# Patient Record
Sex: FEMALE | Race: WHITE | Employment: UNEMPLOYED | URBAN - METROPOLITAN AREA
[De-identification: names, ages, dates, MRNs, and addresses within clinical notes are randomized per-mention and may not be internally consistent; named-entity substitution may affect disease eponyms.]

---

## 2017-02-17 ENCOUNTER — HOSPITAL ENCOUNTER (EMERGENCY)
Age: 72
Discharge: HOME | End: 2017-02-17
Attending: EMERGENCY MEDICINE
Payer: MEDICARE

## 2017-02-17 VITALS
OXYGEN SATURATION: 97 % | RESPIRATION RATE: 14 BRPM | HEART RATE: 76 BPM | HEIGHT: 67 IN | SYSTOLIC BLOOD PRESSURE: 158 MMHG | TEMPERATURE: 98 F | WEIGHT: 187 LBS | BODY MASS INDEX: 29.35 KG/M2 | DIASTOLIC BLOOD PRESSURE: 82 MMHG

## 2017-02-17 DIAGNOSIS — S01.01XA SCALP LACERATION, INITIAL ENCOUNTER: Primary | ICD-10-CM

## 2017-02-17 PROCEDURE — 90471 IMMUNIZATION ADMIN: CPT | Performed by: EMERGENCY MEDICINE

## 2017-02-17 PROCEDURE — 12001 RPR S/N/AX/GEN/TRNK 2.5CM/<: CPT

## 2017-02-17 PROCEDURE — 90715 TDAP VACCINE 7 YRS/> IM: CPT | Performed by: EMERGENCY MEDICINE

## 2017-02-17 PROCEDURE — 99282 EMERGENCY DEPT VISIT SF MDM: CPT

## 2017-02-17 PROCEDURE — 6360000002 HC RX W HCPCS: Performed by: EMERGENCY MEDICINE

## 2017-02-17 RX ORDER — ISOSORBIDE DINITRATE 10 MG/1
10 TABLET ORAL DAILY
COMMUNITY

## 2017-02-17 RX ORDER — METOPROLOL SUCCINATE 25 MG/1
25 TABLET, EXTENDED RELEASE ORAL DAILY
COMMUNITY

## 2017-02-17 RX ORDER — LEVOTHYROXINE SODIUM 112 UG/1
112 TABLET ORAL DAILY
COMMUNITY

## 2017-02-17 RX ORDER — ALLOPURINOL 100 MG/1
100 TABLET ORAL DAILY
COMMUNITY

## 2017-02-17 RX ORDER — LISINOPRIL AND HYDROCHLOROTHIAZIDE 20; 12.5 MG/1; MG/1
1 TABLET ORAL DAILY
COMMUNITY

## 2017-02-17 RX ADMIN — TETANUS TOXOID, REDUCED DIPHTHERIA TOXOID AND ACELLULAR PERTUSSIS VACCINE, ADSORBED 0.5 ML: 5; 2.5; 8; 8; 2.5 SUSPENSION INTRAMUSCULAR at 21:19

## 2017-02-17 ASSESSMENT — PAIN DESCRIPTION - ORIENTATION: ORIENTATION: POSTERIOR

## 2017-02-17 ASSESSMENT — PAIN SCALES - GENERAL: PAINLEVEL_OUTOF10: 2

## 2017-02-17 ASSESSMENT — PAIN DESCRIPTION - LOCATION: LOCATION: HEAD

## 2017-02-17 ASSESSMENT — PAIN DESCRIPTION - DESCRIPTORS: DESCRIPTORS: BURNING

## 2017-02-18 ASSESSMENT — ENCOUNTER SYMPTOMS
ABDOMINAL PAIN: 0
BACK PAIN: 0
CONSTIPATION: 0
EYE REDNESS: 0
EYE DISCHARGE: 0
DIARRHEA: 0
SHORTNESS OF BREATH: 0
COLOR CHANGE: 0
NAUSEA: 0
EYE PAIN: 0
WHEEZING: 0
RHINORRHEA: 0
COUGH: 0
SORE THROAT: 0
CHEST TIGHTNESS: 0

## 2018-07-25 ENCOUNTER — APPOINTMENT (OUTPATIENT)
Dept: CT IMAGING | Age: 73
End: 2018-07-25
Payer: MEDICARE

## 2018-07-25 ENCOUNTER — HOSPITAL ENCOUNTER (EMERGENCY)
Age: 73
Discharge: HOME | End: 2018-07-25
Attending: EMERGENCY MEDICINE
Payer: MEDICARE

## 2018-07-25 VITALS
HEART RATE: 79 BPM | OXYGEN SATURATION: 98 % | BODY MASS INDEX: 28.12 KG/M2 | HEIGHT: 66 IN | TEMPERATURE: 98 F | DIASTOLIC BLOOD PRESSURE: 73 MMHG | WEIGHT: 175 LBS | SYSTOLIC BLOOD PRESSURE: 122 MMHG | RESPIRATION RATE: 14 BRPM

## 2018-07-25 DIAGNOSIS — S80.02XA CONTUSION OF LEFT KNEE, INITIAL ENCOUNTER: ICD-10-CM

## 2018-07-25 DIAGNOSIS — S09.90XA INJURY OF HEAD, INITIAL ENCOUNTER: Primary | ICD-10-CM

## 2018-07-25 DIAGNOSIS — S16.1XXA STRAIN OF NECK MUSCLE, INITIAL ENCOUNTER: ICD-10-CM

## 2018-07-25 PROCEDURE — 99283 EMERGENCY DEPT VISIT LOW MDM: CPT

## 2018-07-25 PROCEDURE — 72125 CT NECK SPINE W/O DYE: CPT

## 2018-07-25 PROCEDURE — 70450 CT HEAD/BRAIN W/O DYE: CPT

## 2018-07-25 ASSESSMENT — PAIN SCALES - GENERAL: PAINLEVEL_OUTOF10: 1

## 2018-07-25 ASSESSMENT — PAIN DESCRIPTION - LOCATION: LOCATION: HEAD;KNEE

## 2018-07-25 ASSESSMENT — PAIN DESCRIPTION - ORIENTATION: ORIENTATION: LEFT

## 2018-07-25 NOTE — ED PROVIDER NOTES
[address] [facility]  eMERGENCY dEPARTMENT eNCOUnter      Pt Name: Alida Wilson  MRN: [id number]  [place] 1945  Date of evaluation: 7/25/2018      CHIEF COMPLAINT       Chief Complaint   Patient presents with    Fall     pt was cleaning house and just finished mopping, pt brought up laundry from basement stairs, and slipped on wet tile floor when she got upstairs. pt slipped backwards, hyperflexing left knee and striking head on tile floor. pt c/o head pain, no hematoma noted or lacerations. swelling and ecchymosis noted to left knee. pms intact.  Head Injury    Knee Pain         HISTORY OF PRESENT ILLNESS      The patient presents to the emergency department for a fall. She slipped on a wet floor and struck the back of her head on the tile floor. She says her head bounced on the floor. She also bent her left knee under her but her knee is not really bothering her. The patient says she awoke with some neck discomfort this morning so she's having some pain in the left side of the neck. She denies any acute neurologic deficit however. She had no LOC. She denies weakness or numbness. She denies headache. She declines medicine for pain. The patient takes baby aspirin every day. Her family is concerned to have her evaluated. She does not have any problems with walking and doesn't feel her knee needs to be addressed. REVIEW OF SYSTEMS       All systems reviewed and negative unless noted in HPI. The patient denies fever or constitutional symptoms. Denies vision change. Denies any sore throat or rhinorrhea. Struck head as noted in HPI. Denies any neck pain or stiffness. Denies chest pain or shortness of breath. No nausea,  vomiting or diarrhea. Denies any dysuria. Denies urinary frequency or hematuria. Mild left knee pain. Denies any weakness, numbness or focal neurologic deficit. Denies any skin rash or edema. No recent psychiatric issues.    No easy nontender with no pain to palpation. Musculoskeletal exam:  Mild swelling and superficial abrasion noted over the left knee at the proximal tibia. No joint effusion. Able to flex and extend normally without pain. Normal distal pulses in all extremities. Skin: no rash or edema. Neurological exam reveals cranial nerves 2 through 12 grossly intact. Patient has equal  and normal deep tendon reflexes. Psychiatric: no hallucinations or suicidal ideation. Lymphatics.:  No lymphadenopathy. DIFFERENTIAL DIAGNOSIS/ MDM:     Contusion, fracture, sprain, ICH    DIAGNOSTIC RESULTS     RADIOLOGY:   I directly visualized the following  images and reviewed the radiologist interpretations:  CT Head WO Contrast   Final Result   1. No CT evidence for acute intracranial process. 2.  Multilevel degenerative disc disease and hypertrophic degenerative   changes of the cervical spine, but no CT evidence for acute osseous   abnormality. CT Cervical Spine WO Contrast   Final Result   1. No CT evidence for acute intracranial process. 2.  Multilevel degenerative disc disease and hypertrophic degenerative   changes of the cervical spine, but no CT evidence for acute osseous   abnormality. CT Cervical Spine WO Contrast (Final result)   Result time 07/25/18 14:31:23   Final result by Saray Piedra MD (07/25/18 14:31:23)                Impression:    1. No CT evidence for acute intracranial process. 2.  Multilevel degenerative disc disease and hypertrophic degenerative  changes of the cervical spine, but no CT evidence for acute osseous  abnormality.             Narrative:    EXAMINATION:  CT OF THE HEAD WITHOUT CONTRAST; CT OF THE CERVICAL SPINE WITHOUT CONTRAST  7/25/2018 1:18 pm    TECHNIQUE:  CT of the head was performed without the administration of intravenous  contrast. Dose modulation, iterative reconstruction, and/or weight based  adjustment of the mA/kV was utilized to reduce the foraminal  stenosis at C4-C5 through C6-C7. SOFT TISSUES:  No prevertebral soft tissue thickening.  The incidentally  imaged paravertebral soft tissues have a normal noncontrast CT appearance. No abnormal lymphadenopathy appreciated.  Thyroid has a normal noncontrast CT  appearance.  Visualized lung parenchyma is well aerated.                    CT Head WO Contrast (Final result)   Result time 07/25/18 14:31:23   Final result by Anurag Wilson MD (07/25/18 14:31:23)                Impression:    1. No CT evidence for acute intracranial process. 2.  Multilevel degenerative disc disease and hypertrophic degenerative  changes of the cervical spine, but no CT evidence for acute osseous  abnormality. Narrative:    EXAMINATION:  CT OF THE HEAD WITHOUT CONTRAST; CT OF THE CERVICAL SPINE WITHOUT CONTRAST  7/25/2018 1:18 pm    TECHNIQUE:  CT of the head was performed without the administration of intravenous  contrast. Dose modulation, iterative reconstruction, and/or weight based  adjustment of the mA/kV was utilized to reduce the radiation dose to as low  as reasonably achievable.; CT of the cervical spine was performed without the  administration of intravenous contrast. Multiplanar reformatted images are  provided for review. Dose modulation, iterative reconstruction, and/or weight  based adjustment of the mA/kV was utilized to reduce the radiation dose to as  low as reasonably achievable. COMPARISON:  None. HISTORY:  ORDERING SYSTEM PROVIDED HISTORY: fall  TECHNOLOGIST PROVIDED HISTORY:  Ordering Physician Provided Reason for Exam: posterior head trauma/pain  Acuity: Acute  Type of Exam: Initial  Mechanism of Injury: fall hitting posterior head on tile    FINDINGS:  CT HEAD:    BRAIN/VENTRICLES: There is no acute intracranial hemorrhage, mass effect or  midline shift.  No abnormal extra-axial fluid collection.  The gray-white  differentiation is maintained without evidence of an acute infarct.

## 2018-09-07 ENCOUNTER — HOSPITAL ENCOUNTER (EMERGENCY)
Age: 73
Discharge: HOME | End: 2018-09-07
Attending: EMERGENCY MEDICINE
Payer: MEDICARE

## 2018-09-07 VITALS
TEMPERATURE: 101 F | RESPIRATION RATE: 18 BRPM | HEIGHT: 66 IN | WEIGHT: 175 LBS | BODY MASS INDEX: 28.12 KG/M2 | HEART RATE: 101 BPM | SYSTOLIC BLOOD PRESSURE: 128 MMHG | DIASTOLIC BLOOD PRESSURE: 63 MMHG | OXYGEN SATURATION: 94 %

## 2018-09-07 DIAGNOSIS — E86.0 DEHYDRATION: ICD-10-CM

## 2018-09-07 DIAGNOSIS — R11.10 VOMITING IN ADULT: Primary | ICD-10-CM

## 2018-09-07 LAB
-: ABNORMAL
ABSOLUTE EOS #: 0 K/UL (ref 0–0.4)
ABSOLUTE IMMATURE GRANULOCYTE: ABNORMAL K/UL (ref 0–0.3)
ABSOLUTE LYMPH #: 0.32 K/UL (ref 1–4.8)
ABSOLUTE MONO #: 0.54 K/UL (ref 0.1–0.8)
ALBUMIN SERPL-MCNC: 4.1 G/DL (ref 3.5–5.2)
ALBUMIN/GLOBULIN RATIO: 1.2 (ref 1–2.5)
ALP BLD-CCNC: 141 U/L (ref 35–104)
ALT SERPL-CCNC: 327 U/L (ref 5–33)
AMORPHOUS: ABNORMAL
AMYLASE: 31 U/L (ref 28–100)
ANION GAP SERPL CALCULATED.3IONS-SCNC: 16 MMOL/L (ref 9–17)
AST SERPL-CCNC: 685 U/L
BACTERIA: ABNORMAL
BASOPHILS # BLD: 0 % (ref 0–2)
BASOPHILS ABSOLUTE: 0 K/UL (ref 0–0.2)
BILIRUB SERPL-MCNC: 1.29 MG/DL (ref 0.3–1.2)
BILIRUBIN DIRECT: 0.64 MG/DL
BILIRUBIN URINE: ABNORMAL
BILIRUBIN, INDIRECT: 0.65 MG/DL (ref 0–1)
BUN BLDV-MCNC: 11 MG/DL (ref 8–23)
BUN/CREAT BLD: ABNORMAL (ref 9–20)
CALCIUM SERPL-MCNC: 10 MG/DL (ref 8.6–10.4)
CASTS UA: ABNORMAL /LPF
CHLORIDE BLD-SCNC: 92 MMOL/L (ref 98–107)
CO2: 25 MMOL/L (ref 20–31)
COLOR: YELLOW
COMMENT UA: ABNORMAL
CREAT SERPL-MCNC: 0.74 MG/DL (ref 0.5–0.9)
CRYSTALS, UA: ABNORMAL /HPF
DIFFERENTIAL TYPE: ABNORMAL
DIRECT EXAM: NORMAL
EOSINOPHILS RELATIVE PERCENT: 0 % (ref 1–4)
EPITHELIAL CELLS UA: ABNORMAL /HPF (ref 0–5)
GFR AFRICAN AMERICAN: >60 ML/MIN
GFR NON-AFRICAN AMERICAN: >60 ML/MIN
GFR SERPL CREATININE-BSD FRML MDRD: ABNORMAL ML/MIN/{1.73_M2}
GFR SERPL CREATININE-BSD FRML MDRD: ABNORMAL ML/MIN/{1.73_M2}
GLOBULIN: ABNORMAL G/DL (ref 1.5–3.8)
GLUCOSE BLD-MCNC: 190 MG/DL (ref 70–99)
GLUCOSE URINE: NEGATIVE
HCT VFR BLD CALC: 37 % (ref 36–46)
HEMOGLOBIN: 12.3 G/DL (ref 12–16)
IMMATURE GRANULOCYTES: ABNORMAL %
KETONES, URINE: ABNORMAL
LEUKOCYTE ESTERASE, URINE: ABNORMAL
LIPASE: 39 U/L (ref 13–60)
LYMPHOCYTES # BLD: 3 % (ref 24–44)
Lab: NORMAL
MCH RBC QN AUTO: 28 PG (ref 26–34)
MCHC RBC AUTO-ENTMCNC: 33.2 G/DL (ref 31–37)
MCV RBC AUTO: 84.3 FL (ref 80–100)
MONOCYTES # BLD: 5 % (ref 1–7)
MORPHOLOGY: ABNORMAL
MUCUS: ABNORMAL
NITRITE, URINE: NEGATIVE
NRBC AUTOMATED: ABNORMAL PER 100 WBC
OTHER OBSERVATIONS UA: ABNORMAL
PDW BLD-RTO: 14.3 % (ref 12.5–15.4)
PH UA: 6.5 (ref 5–8)
PLATELET # BLD: 293 K/UL (ref 140–450)
PLATELET ESTIMATE: ABNORMAL
PMV BLD AUTO: 7.8 FL (ref 6–12)
POTASSIUM SERPL-SCNC: 3.9 MMOL/L (ref 3.7–5.3)
PROTEIN UA: ABNORMAL
RBC # BLD: 4.39 M/UL (ref 4–5.2)
RBC # BLD: ABNORMAL 10*6/UL
RBC UA: ABNORMAL /HPF (ref 0–2)
RENAL EPITHELIAL, UA: ABNORMAL /HPF
SEG NEUTROPHILS: 92 % (ref 36–66)
SEGMENTED NEUTROPHILS ABSOLUTE COUNT: 9.94 K/UL (ref 1.8–7.7)
SODIUM BLD-SCNC: 133 MMOL/L (ref 135–144)
SPECIFIC GRAVITY UA: 1.01 (ref 1–1.03)
SPECIMEN DESCRIPTION: NORMAL
STATUS: NORMAL
TOTAL PROTEIN: 7.6 G/DL (ref 6.4–8.3)
TRICHOMONAS: ABNORMAL
TURBIDITY: ABNORMAL
URINE HGB: ABNORMAL
UROBILINOGEN, URINE: NORMAL
WBC # BLD: 10.8 K/UL (ref 3.5–11)
WBC # BLD: ABNORMAL 10*3/UL
WBC UA: ABNORMAL /HPF (ref 0–5)
YEAST: ABNORMAL

## 2018-09-07 PROCEDURE — 87804 INFLUENZA ASSAY W/OPTIC: CPT

## 2018-09-07 PROCEDURE — 6360000002 HC RX W HCPCS: Performed by: EMERGENCY MEDICINE

## 2018-09-07 PROCEDURE — 81001 URINALYSIS AUTO W/SCOPE: CPT

## 2018-09-07 PROCEDURE — 85025 COMPLETE CBC W/AUTO DIFF WBC: CPT

## 2018-09-07 PROCEDURE — 36415 COLL VENOUS BLD VENIPUNCTURE: CPT

## 2018-09-07 PROCEDURE — 83690 ASSAY OF LIPASE: CPT

## 2018-09-07 PROCEDURE — 99283 EMERGENCY DEPT VISIT LOW MDM: CPT

## 2018-09-07 PROCEDURE — 2580000003 HC RX 258: Performed by: EMERGENCY MEDICINE

## 2018-09-07 PROCEDURE — 80048 BASIC METABOLIC PNL TOTAL CA: CPT

## 2018-09-07 PROCEDURE — 82150 ASSAY OF AMYLASE: CPT

## 2018-09-07 PROCEDURE — 80076 HEPATIC FUNCTION PANEL: CPT

## 2018-09-07 PROCEDURE — 96374 THER/PROPH/DIAG INJ IV PUSH: CPT

## 2018-09-07 PROCEDURE — 87086 URINE CULTURE/COLONY COUNT: CPT

## 2018-09-07 PROCEDURE — 96361 HYDRATE IV INFUSION ADD-ON: CPT

## 2018-09-07 PROCEDURE — 96375 TX/PRO/DX INJ NEW DRUG ADDON: CPT

## 2018-09-07 RX ORDER — ONDANSETRON 4 MG/1
4 TABLET, ORALLY DISINTEGRATING ORAL EVERY 8 HOURS PRN
Qty: 20 TABLET | Refills: 0 | Status: SHIPPED | OUTPATIENT
Start: 2018-09-07

## 2018-09-07 RX ORDER — ONDANSETRON 2 MG/ML
4 INJECTION INTRAMUSCULAR; INTRAVENOUS ONCE
Status: COMPLETED | OUTPATIENT
Start: 2018-09-07 | End: 2018-09-07

## 2018-09-07 RX ORDER — KETOROLAC TROMETHAMINE 15 MG/ML
15 INJECTION, SOLUTION INTRAMUSCULAR; INTRAVENOUS ONCE
Status: COMPLETED | OUTPATIENT
Start: 2018-09-07 | End: 2018-09-07

## 2018-09-07 RX ORDER — 0.9 % SODIUM CHLORIDE 0.9 %
1000 INTRAVENOUS SOLUTION INTRAVENOUS ONCE
Status: COMPLETED | OUTPATIENT
Start: 2018-09-07 | End: 2018-09-07

## 2018-09-07 RX ADMIN — ONDANSETRON 4 MG: 2 INJECTION INTRAMUSCULAR; INTRAVENOUS at 16:09

## 2018-09-07 RX ADMIN — SODIUM CHLORIDE 1000 ML: 9 INJECTION, SOLUTION INTRAVENOUS at 16:08

## 2018-09-07 RX ADMIN — KETOROLAC TROMETHAMINE 15 MG: 15 INJECTION, SOLUTION INTRAMUSCULAR; INTRAVENOUS at 16:11

## 2018-09-07 ASSESSMENT — PAIN SCALES - GENERAL
PAINLEVEL_OUTOF10: 2
PAINLEVEL_OUTOF10: 0
PAINLEVEL_OUTOF10: 2

## 2018-09-07 ASSESSMENT — PAIN DESCRIPTION - FREQUENCY: FREQUENCY: INTERMITTENT

## 2018-09-07 ASSESSMENT — PAIN DESCRIPTION - DESCRIPTORS: DESCRIPTORS: ACHING

## 2018-09-07 ASSESSMENT — PAIN DESCRIPTION - LOCATION: LOCATION: HEAD

## 2018-09-07 ASSESSMENT — PAIN DESCRIPTION - PAIN TYPE: TYPE: ACUTE PAIN

## 2018-09-07 NOTE — ED PROVIDER NOTES
[address] [facility]  eMERGENCY dEPARTMENT eNCOUnter      Pt Name: Toni Aldana  MRN: [id number]  [place] 1945  Date of evaluation: 2018      CHIEF COMPLAINT       Chief Complaint   Patient presents with    Fever    Nausea    Headache         HISTORY OF PRESENT ILLNESS      The patient presents to the emergency department stating \"I think I have the flu\". The patient reports onset of fever accompanied by nausea and headache last night. She vomited some \"bile\" this morning. She has not had a lot of pain however. She denies diarrhea. She denies neck discomfort. She had chills earlier but now after taking aspirin is sweating. She denies rash. The headache is in her whole head. She denies nasal congestion. She does feel improved after taking aspirin. REVIEW OF SYSTEMS       All systems reviewed and negative unless noted in HPI. The patient reports fever to 103. Inocencia Meeks Denies vision change. Denies any sore throat or rhinorrhea. Denies any neck pain or stiffness. Denies chest pain or shortness of breath. Nausea and vomiting without abdominal pain. Denies any dysuria. Denies urinary frequency or hematuria. Denies musculoskeletal injury or pain. Denies any weakness, numbness or focal neurologic deficit. Denies any skin rash or edema. No recent psychiatric issues. No easy bruising or bleeding. History of DM. PAST MEDICAL HISTORY    has a past medical history of Diabetes ([address] Gout; and Hypertension. SURGICAL HISTORY      has a past surgical history that includes back surgery;  section; and Cholecystectomy.     CURRENT MEDICATIONS       Previous Medications    ALLOPURINOL (ZYLOPRIM) 100 MG TABLET    Take 100 mg by mouth daily    ASPIRIN 81 MG TABLET    Take 81 mg by mouth daily    ISOSORBIDE DINITRATE (ISORDIL) 10 MG TABLET    Take 10 mg by mouth daily     LEVOTHYROXINE (SYNTHROID) 112 MCG TABLET    Take 112 mcg by mouth Daily LISINOPRIL-HYDROCHLOROTHIAZIDE (PRINZIDE;ZESTORETIC) 20-12.5 MG PER TABLET    Take 1 tablet by mouth daily    METFORMIN (GLUCOPHAGE) 1000 MG TABLET    Take 1,000 mg by mouth 2 times daily (with meals)     METOPROLOL SUCCINATE (TOPROL XL) 25 MG EXTENDED RELEASE TABLET    Take 25 mg by mouth daily       ALLERGIES     is allergic to cefazolin. FAMILY HISTORY     has no family status information on file. family history is not on file. SOCIAL HISTORY      reports that she has never smoked. She does not have any smokeless tobacco history on file. She reports that she does not drink alcohol or use drugs. PHYSICAL EXAM     INITIAL VITALS:  height is 5' 6\" (1.676 m) and weight is 79.4 kg (175 lb). Her temperature is 101 °F (38.3 °C). Her blood pressure is 128/63 and her pulse is 101. Her respiration is 18 and oxygen saturation is 94%. Patient is alert and oriented, in no apparent distress. HEENT is atraumatic. Pupils are PERRL at 4 mm. Mucous membranes moist.  Posterior pharynx unremarkable. Neck is supple with no lymphadenopathy. No JVD. No meningismus. Heart sounds regular rate and rhythm with no gallops, murmurs, or rubs. Lungs clear, no wheezes, rales or rhonchi. Abdomen: soft, nontender with no pain to palpation. Normal bowel sounds are noted. No rebound or guarding. Musculoskeletal exam shows no evidence of trauma. Skin: no rash or edema. Neurological exam reveals cranial nerves 2 through 12 grossly intact. Patient has equal  and normal deep tendon reflexes. Psychiatric: no hallucinations or suicidal ideation. Lymphatics.:  No lymphadenopathy. DIFFERENTIAL DIAGNOSIS/ MDM:     Vomiting, dehydration, influenza, meningitis    DIAGNOSTIC RESULTS       LABS:  Results for orders placed or performed during the hospital encounter of 09/07/18   Rapid Influenza A/B Antigens   Result Value Ref Range    Specimen Description . NASOPHARYNGEAL SWAB     Special Requests

## 2018-09-08 LAB
CULTURE: NORMAL
Lab: NORMAL
SPECIMEN DESCRIPTION: NORMAL
STATUS: NORMAL

## 2021-11-23 ENCOUNTER — HOSPITAL ENCOUNTER (OUTPATIENT)
Age: 76
Setting detail: SPECIMEN
Discharge: HOME | End: 2021-11-23

## 2021-11-23 LAB
CREATININE URINE: 124 MG/DL (ref 28–217)
MICROALBUMIN/CREAT 24H UR: 14 MG/L
MICROALBUMIN/CREAT UR-RTO: 11 MCG/MG CREAT

## 2023-12-18 PROBLEM — D48.5 NEOPLASM OF UNCERTAIN BEHAVIOR OF SKIN: Status: ACTIVE | Noted: 2023-12-18

## 2024-01-08 ENCOUNTER — HOSPITAL ENCOUNTER (OUTPATIENT)
Age: 79
Setting detail: SPECIMEN
Discharge: HOME | End: 2024-01-08

## 2024-01-09 LAB
MICROORGANISM SPEC CULT: NORMAL
SPECIMEN DESCRIPTION: NORMAL